# Patient Record
Sex: FEMALE | Race: WHITE
[De-identification: names, ages, dates, MRNs, and addresses within clinical notes are randomized per-mention and may not be internally consistent; named-entity substitution may affect disease eponyms.]

---

## 2022-09-03 ENCOUNTER — HOSPITAL ENCOUNTER (EMERGENCY)
Dept: HOSPITAL 4 - SED | Age: 22
Discharge: HOME | End: 2022-09-03
Payer: COMMERCIAL

## 2022-09-03 VITALS — SYSTOLIC BLOOD PRESSURE: 123 MMHG

## 2022-09-03 VITALS — WEIGHT: 200 LBS | BODY MASS INDEX: 36.8 KG/M2 | HEIGHT: 62 IN

## 2022-09-03 DIAGNOSIS — U07.1: Primary | ICD-10-CM

## 2022-09-03 DIAGNOSIS — J02.9: ICD-10-CM

## 2022-09-03 DIAGNOSIS — Z79.899: ICD-10-CM

## 2022-09-03 DIAGNOSIS — J40: ICD-10-CM

## 2022-09-03 DIAGNOSIS — R05.9: ICD-10-CM

## 2022-09-03 NOTE — NUR
PT BIB FATHER, REPORTS TESTED + FOR COVID TODAY, WAS SEEN AT  2 DAYS AGO AND 
RX OF

BENZONATATE GIVEN, STATES COUGH IS WORSE AND NOW HAS CONGESTION. PT IS 
AMBULATORY, AAOX4, VSS

## 2022-09-03 NOTE — NUR
Patient given written and verbal discharge instructions and verbalizes 
understanding.  ER MD discussed with patient the results and treatment 
provided. Patient in stable condition. ID arm band removed. IV catheter removed 
intact and dressing applied, no active bleeding.

Rx of ALBUTEROL MDI AND PREDNISONE given. Patient educated on pain management 
and to follow up with PMD. Pain Scale 0/10.

Opportunity for questions provided and answered. Medication side effect fact 
sheet provided.

## 2022-11-23 ENCOUNTER — HOSPITAL ENCOUNTER (EMERGENCY)
Dept: HOSPITAL 4 - SED | Age: 22
LOS: 1 days | Discharge: LEFT BEFORE DISCHARGE | End: 2022-11-24
Payer: COMMERCIAL

## 2022-11-23 VITALS — SYSTOLIC BLOOD PRESSURE: 122 MMHG

## 2022-11-23 VITALS — HEIGHT: 62 IN | WEIGHT: 200 LBS | BODY MASS INDEX: 36.8 KG/M2

## 2022-11-23 DIAGNOSIS — R51.9: ICD-10-CM

## 2022-11-23 DIAGNOSIS — Z53.21: ICD-10-CM

## 2022-11-23 DIAGNOSIS — R05.9: Primary | ICD-10-CM

## 2022-11-23 DIAGNOSIS — Z20.822: ICD-10-CM

## 2022-11-23 DIAGNOSIS — R09.81: ICD-10-CM
